# Patient Record
Sex: MALE | Race: ASIAN | URBAN - METROPOLITAN AREA
[De-identification: names, ages, dates, MRNs, and addresses within clinical notes are randomized per-mention and may not be internally consistent; named-entity substitution may affect disease eponyms.]

---

## 2018-07-31 ENCOUNTER — OFFICE VISIT (OUTPATIENT)
Dept: INTERNAL MEDICINE CLINIC | Age: 38
End: 2018-07-31

## 2018-07-31 VITALS
TEMPERATURE: 98.4 F | WEIGHT: 139.8 LBS | DIASTOLIC BLOOD PRESSURE: 80 MMHG | OXYGEN SATURATION: 98 % | BODY MASS INDEX: 23.87 KG/M2 | RESPIRATION RATE: 16 BRPM | HEIGHT: 64 IN | HEART RATE: 93 BPM | SYSTOLIC BLOOD PRESSURE: 121 MMHG

## 2018-07-31 DIAGNOSIS — S86.912A STRAIN OF LEFT KNEE AND LEG, INITIAL ENCOUNTER: Primary | ICD-10-CM

## 2018-07-31 RX ORDER — MELOXICAM 15 MG/1
15 TABLET ORAL
Qty: 60 TAB | Refills: 1 | Status: SHIPPED | OUTPATIENT
Start: 2018-07-31

## 2018-07-31 NOTE — PROGRESS NOTES
Identified pt with two pt identifiers(name and ). Reviewed record in preparation for visit and have obtained necessary documentation. Pascale Persaud presents today for Chief Complaint Patient presents with [facility] Establish Care  Knee Pain Pascale Persaud preferred language for health care discussion is english/other. Is someone accompanying this pt? No 
 
Is the patient using any DME equipment during OV? No 
 
Depression Screening: PHQ over the last two weeks 2018 Little interest or pleasure in doing things Not at all Feeling down, depressed, irritable, or hopeless Not at all Total Score PHQ 2 0 Learning Assessment: 
Learning Assessment 2018 PRIMARY LEARNER Patient HIGHEST LEVEL OF EDUCATION - PRIMARY LEARNER  4 YEARS OF COLLEGE  
BARRIERS PRIMARY LEARNER NONE  
CO-LEARNER CAREGIVER No  
PRIMARY LANGUAGE ENGLISH  
LEARNER PREFERENCE PRIMARY DEMONSTRATION  
ANSWERED BY Patient RELATIONSHIP SELF Fall Risk N/I Health Maintenance reviewed and discussed per provider. Yes Advance Directive: 1. Do you have an advance directive in place? Patient Reply: No 
 
2. If not, would you like material regarding how to put one in place? Patient Reply: No 
 
Coordination of Care: 1. Have you been to the ER, urgent care clinic since your last visit? Hospitalized since your last visit? No 
 
2. Have you seen or consulted any other health care providers outside of the [address] [place] [address] since your last visit? Include any colon screening.  No

## 2018-07-31 NOTE — PROGRESS NOTES
FAMILY MEDICINE CLINIC NOTE 
 
S: The patient reports that he was trail running about 10 days ago when he felt a sudden onset sharp pain in the proximal aspect of the left calf, he kept running, the pain subsided until the next day when he felt sharp pain and decreased extension of the knee. He has been limping since then and still cannot fully extend his knee. The pain is getting better but the patient is still limping. No current outpatient prescriptions on file prior to visit. No current facility-administered medications on file prior to visit. History reviewed. No pertinent past medical history. Social History Social History  Marital status: SINGLE Spouse name: N/A  
 Number of children: N/A  
 Years of education: N/A Occupational History  Not on file. Social History Main Topics  Smoking status: Current Every Day Smoker Packs/day: 0.50  Smokeless tobacco: Former User  Alcohol use No  
 Drug use: No  
 Sexual activity: Not on file Other Topics Concern  Not on file Social History Narrative  No narrative on file Family History Problem Relation Age of Onset  No Known Problems Mother  No Known Problems Father O: 
Visit Vitals  /80 (BP 1 Location: Right arm, BP Patient Position: Sitting)  Pulse 93  Temp 98.4 °F (36.9 °C) (Oral)  Resp 16  
 Ht 5' 4\" (1.626 m)  Wt 139 lb 12.8 oz (63.4 kg)  SpO2 98%  BMI 24 kg/m2 NAD, comfortable Negative anterior drawer test 
Negative Lachman's No pain with varus/valgus stress No knee effusion No TTP of the knee Mild limp 41 y.o. male ICD-10-CM ICD-9-CM 1. Strain of left knee and leg, initial encounter R63.910A 844.9 meloxicam (MOBIC) 15 mg tablet    REFERRAL TO ORTHOPEDICS

## 2018-07-31 NOTE — MR AVS SNAPSHOT
[facility] 
 
 
 [address] [facility] [address] [address] 
[phone] Patient: Drue Romberg MRN: [id number] DNJ:[birth year] Visit Information Date & Time Provider Department Dept. Phone Encounter #  
 7/31/2018  1:30 PM [address] [phone] [id number] Upcoming Health Maintenance Date Due DTaP/Tdap/Td series (1 - Tdap) 1/9/2003 Influenza Age 5 to Adult 8/1/2018 Allergies as of 7/31/2018  Review Complete On: 7/31/2018 By: Suraj Florence MD  
 No Known Allergies Current Immunizations  Never Reviewed No immunizations on file. Not reviewed this visit You Were Diagnosed With   
  
 Codes Comments Strain of left knee and leg, initial encounter    -  Primary ICD-10-CM: S07.705V ICD-9-CM: 466. 9 Vitals BP Pulse Temp Resp Height(growth percentile) Weight(growth percentile) 121/80 (BP 1 Location: Right arm, BP Patient Position: Sitting) 93 98.4 °F (36.9 °C) (Oral) 16 5' 4\" (1.626 m) 139 lb 12.8 oz (63.4 kg) SpO2 BMI Smoking Status 98% 24 kg/m2 Current Every Day Smoker Vitals History BMI and BSA Data Body Mass Index Body Surface Area  
 24 kg/m 2 1.69 m 2 Your Updated Medication List  
  
   
This list is accurate as of 7/31/18  2:07 PM.  Always use your most recent med list.  
  
  
  
  
 meloxicam 15 mg tablet Commonly known as:  MOBIC Take 1 Tab by mouth daily as needed for Pain. Prescriptions Printed Refills  
 meloxicam (MOBIC) 15 mg tablet 1 Sig: Take 1 Tab by mouth daily as needed for Pain. Class: Print Route: Oral  
  
We Performed the Following REFERRAL TO ORTHOPEDICS [IDG421 Custom] Comments:  
 Please evaluate patient for left proximal calf pain with sudden onset while running and decreased full left knee extension Referral Information Referral ID Referred By Referred To [id number] [address] [facility] Lash[place] Sharon Golden Phone: [phone] Fax: [phone] Visits Status Start Date End Date 1 New Request 7/31/18 7/31/19 If your referral has a status of pending review or denied, additional information will be sent to support the outcome of this decision. Introducing [facility]! Callie Jaffe introduces [employer] patient portal. Now you can access parts of your medical record, email your doctor's office, and request medication refills online. 1. In your internet browser, go to [email or web address] 2. Click on the First Time User? Click Here link in the Sign In box. You will see the New Member Sign Up page. 3. Enter your Rosum Access Code exactly as it appears below. You will not need to use this code after youve completed the sign-up process. If you do not sign up before the expiration date, you must request a new code. · Rosum Access Code: [email or web address] Expires: 10/29/2018  2:02 PM 
 
4. Enter the last four digits of your Social Security Number (xxxx) and Date of Birth (mm/dd/yyyy) as indicated and click Submit. You will be taken to the next sign-up page. 5. Create a Rosum ID. This will be your Rosum login ID and cannot be changed, so think of one that is secure and easy to remember. 6. Create a Rosum password. You can change your password at any time. 7. Enter your Password Reset Question and Answer. This can be used at a later time if you forget your password. 8. Enter your e-mail address. You will receive e-mail notification when new information is available in [address] 9. Click Sign Up. You can now view and download portions of your medical record. 10. Click the Download Summary menu link to download a portable copy of your medical information.  
 
If you have questions, please visit the Frequently Asked Questions section of the [employer]. Remember, [employer] is NOT to be used for urgent needs. For medical emergencies, dial 911. Now available from your iPhone and Android! Please provide this summary of care documentation to your next provider. If you have any questions after today's visit, please call [phone].

## 2018-08-09 ENCOUNTER — OFFICE VISIT (OUTPATIENT)
Dept: ORTHOPEDIC SURGERY | Age: 38
End: 2018-08-09

## 2018-08-09 VITALS
HEIGHT: 64 IN | SYSTOLIC BLOOD PRESSURE: 139 MMHG | WEIGHT: 141 LBS | HEART RATE: 101 BPM | DIASTOLIC BLOOD PRESSURE: 89 MMHG | TEMPERATURE: 98.2 F | RESPIRATION RATE: 14 BRPM | BODY MASS INDEX: 24.07 KG/M2

## 2018-08-09 DIAGNOSIS — M76.892 POPLITEUS TENDINITIS OF LEFT LOWER EXTREMITY: Primary | ICD-10-CM

## 2018-08-09 RX ORDER — ACETAMINOPHEN 500 MG
TABLET ORAL
COMMUNITY

## 2018-08-09 NOTE — PROGRESS NOTES
HISTORY OF PRESENT ILLNESS    Jaye Waters is a 41y.o. year old male comes in today as new patient to be evaluated and treated at the request of JULIO Arauz MD for my opinion/advice regarding: left knee pain    Patients symptoms have been present for 3 weeks. Pain level 5/10, It has improved with OTC NSIADs. Patient has tried:  Rest and heat which has helped. It is described as pain back of left knee after fall during obstacle course on that knee. Some swell after but resolved and bruising. Social History     Social History    Marital status: SINGLE     Spouse name: N/A    Number of children: N/A    Years of education: N/A     Social History Main Topics    Smoking status: Current Every Day Smoker     Packs/day: 0.50    Smokeless tobacco: Former User    Alcohol use No    Drug use: No    Sexual activity: Not Asked     Other Topics Concern    None     Social History Narrative     Current Outpatient Prescriptions   Medication Sig Dispense Refill    acetaminophen (TYLENOL EXTRA STRENGTH) 500 mg tablet Take  by mouth every six (6) hours as needed for Pain.  meloxicam (MOBIC) 15 mg tablet Take 1 Tab by mouth daily as needed for Pain. 60 Tab 1     History reviewed. No pertinent past medical history. Family History   Problem Relation Age of Onset    No Known Problems Mother     No Known Problems Father          ROS:  All other systems reviewed and negative aside from that written in the HPI. Objective:  /89  Pulse (!) 101  Temp 98.2 °F (36.8 °C)  Resp 14  Ht 5' 4\" (1.626 m)  Wt 141 lb (64 kg)  BMI 24.2 kg/m2  GEN: Appears stated age in NAD. HEAD:  Normocephalic, atraumatic. NEURO:  Sensation intact light touch B/L lower extremities. MS:  LE Strength +5/5 bilateral .   left Knee:  No Effusion . Lachman negative. Valgus negative. Varus negative. negative joint line tenderness medial, lateral.  Juan negative. Posterior drawer negative. Noble compression test negative.   Patellar apprehension negative. Patellar facet  negative tender to palpation medial, lateral no crepitance bilateral .  Pes anserine negative TTP bilateral.  No TTP hamstring but very TTP popliteus  EXT: no clubbing/cyanosis. no edema. SKIN: Warm/dry without rash. HEENT: Conjunctiva/lids WNL. External canals/nares WNL. Tongue midline. PERRL, EOMI. Hearing intact. NECK: Trachea midline. Supple, Full ROM. No thyromegaly. CARDIAC: No edema. LUNGS: Normal effort. ABD: Soft, NT. No HSM. PSYCH: A+O x3. Appropriate judgment and insight. Assessment/Plan:     ICD-10-CM ICD-9-CM    1. Popliteus tendinitis of left lower extremity M76.892 726.8        Patient verbalizes understanding of evaluation and plan. Will stretch as demo and Rx for mobis daily  And RTC 4 weeks. Likely MRI not improved.

## 2018-08-09 NOTE — MR AVS SNAPSHOT
[facility] 
 
 
 [address] [facility], [address] [facility] [address] 
[phone] Patient: Serge Mathews MRN: [id number] [facility]:[birth year] Visit Information Date & Time Provider Department Dept. Phone Encounter #  
 8/9/2018  8:40 AM John Paulino, [address] [facility] [phone] [id number] Follow-up Instructions Return in about 4 weeks (around 9/6/2018) for left knee popliteus. Routing History Upcoming Health Maintenance Date Due Pneumococcal 19-64 Medium Risk (1 of 1 - PPSV23) 1/9/2001 DTaP/Tdap/Td series (1 - Tdap) 1/9/2003 Influenza Age 5 to Adult 8/1/2018 Allergies as of 8/9/2018  Review Complete On: 8/9/2018 By: John Paulino DO No Known Allergies Current Immunizations  Never Reviewed No immunizations on file. Not reviewed this visit You Were Diagnosed With   
  
 Codes Comments Popliteus tendinitis of left lower extremity    -  Primary ICD-10-CM: Y37.562 ICD-9-CM: 726.8 Vitals BP Pulse Temp Resp Height(growth percentile) Weight(growth percentile) 139/89 (!) 101 98.2 °F (36.8 °C) 14 5' 4\" (1.626 m) 141 lb (64 kg) BMI Smoking Status 24.2 kg/m2 Current Every Day Smoker Vitals History BMI and BSA Data Body Mass Index Body Surface Area  
 24.2 kg/m 2 1.7 m 2 Your Updated Medication List  
  
   
This list is accurate as of 8/9/18  9:12 AM.  Always use your most recent med list.  
  
  
  
  
 meloxicam 15 mg tablet Commonly known as:  MOBIC Take 1 Tab by mouth daily as needed for Pain. TYLENOL EXTRA STRENGTH 500 mg tablet Generic drug:  acetaminophen Take  by mouth every six (6) hours as needed for Pain. Follow-up Instructions Return in about 4 weeks (around 9/6/2018) for left knee popliteus. Introducing [facility]!    
 [employer] introduces [employer] patient portal. Now you can access parts of your medical record, email your doctor's office, and request medication refills online. 1. In your internet browser, go to [email or web address] & other things you bake. [email or web address] & other things you bake 2. Click on the First Time User? Click Here link in the Sign In box. You will see the New Member Sign Up page. 3. Enter your [employer] Access Code exactly as it appears below. You will not need to use this code after youve completed the sign-up process. If you do not sign up before the expiration date, you must request a new code. · [employer] Access Code: [email or web address] Expires: 10/29/2018  2:02 PM 
 
4. Enter the last four digits of your Social Security Number (xxxx) and Date of Birth (mm/dd/yyyy) as indicated and click Submit. You will be taken to the next sign-up page. 5. Create a [employer] ID. This will be your [employer] login ID and cannot be changed, so think of one that is secure and easy to remember. 6. Create a [employer] password. You can change your password at any time. 7. Enter your Password Reset Question and Answer. This can be used at a later time if you forget your password. 8. Enter your e-mail address. You will receive e-mail notification when new information is available in [address] 9. Click Sign Up. You can now view and download portions of your medical record. 10. Click the Download Summary menu link to download a portable copy of your medical information. If you have questions, please visit the Frequently Asked Questions section of the [employer] website. Remember, [employer] is NOT to be used for urgent needs. For medical emergencies, dial 911. Now available from your iPhone and Android! Please provide this summary of care documentation to your next provider. If you have any questions after today's visit, please call [phone].